# Patient Record
Sex: MALE | Race: WHITE | Employment: FULL TIME | URBAN - METROPOLITAN AREA
[De-identification: names, ages, dates, MRNs, and addresses within clinical notes are randomized per-mention and may not be internally consistent; named-entity substitution may affect disease eponyms.]

---

## 2018-05-14 ENCOUNTER — TELEPHONE (OUTPATIENT)
Dept: FAMILY MEDICINE | Facility: CLINIC | Age: 48
End: 2018-05-14

## 2018-05-14 NOTE — TELEPHONE ENCOUNTER
Reason for Call:  Medication or medication refill:    Do you use a [facility]?  Name of the pharmacy and phone number for the current request:  [facility] [address] [place] [address] [place] AT [facility] [address]    Name of the medication requested: Azithromycin    Other request: Patient is requesting the medication above. States his daughter was diagnosed with whopping cough and was instructed that everyone in the family be treated with the medication above. Please assist. Thanks!    Can we leave a detailed message on this number? YES    Phone number patient can be reached at: Home number on file [phone] (home)    Best Time: Any    Call taken on 5/14/2018 at 6:34 PM by Kaykay Dominguez

## 2018-05-14 NOTE — TELEPHONE ENCOUNTER
PT called clinic again.  Family wanted treatment tonight.  I recommended each family member do on [email or web address] virtual care visit.  Providers respond in one hour.  QUYEN Geller

## 2018-05-15 ENCOUNTER — OFFICE VISIT (OUTPATIENT)
Dept: FAMILY MEDICINE | Facility: CLINIC | Age: 48
End: 2018-05-15
Payer: COMMERCIAL

## 2018-05-15 VITALS
WEIGHT: 189 LBS | TEMPERATURE: 97.3 F | HEART RATE: 78 BPM | OXYGEN SATURATION: 95 % | BODY MASS INDEX: 26.55 KG/M2 | DIASTOLIC BLOOD PRESSURE: 76 MMHG | SYSTOLIC BLOOD PRESSURE: 109 MMHG

## 2018-05-15 DIAGNOSIS — Z20.818 PERTUSSIS EXPOSURE: Primary | ICD-10-CM

## 2018-05-15 PROCEDURE — 99213 OFFICE O/P EST LOW 20 MIN: CPT | Performed by: FAMILY MEDICINE

## 2018-05-15 RX ORDER — AZITHROMYCIN 250 MG/1
TABLET, FILM COATED ORAL
Qty: 6 TABLET | Refills: 0 | Status: SHIPPED | OUTPATIENT
Start: 2018-05-15 | End: 2022-03-04

## 2018-05-15 NOTE — MR AVS SNAPSHOT
After Visit Summary   5/15/2018    Americo Wesley    MRN: [id number]           Patient Information     Date Of Birth          1970        Visit Information        Provider Department      5/15/2018 4:00 PM Ayan Howell MD [facility]        Today's Diagnoses     Pertussis exposure    -  1       Follow-ups after your visit        Your next 10 appointments already scheduled     May 15, 2018  4:00 PM CDT   Office Visit with Ayan Howell MD   [facility] ([facility])    [address] [place] [id number] [phone]           Bring a current list of meds and any records pertaining to this visit. For Physicals, please bring immunization records and any forms needing to be filled out. Please arrive 10 minutes early to complete paperwork.              Who to contact     If you have questions or need follow up information about today's clinic visit or your schedule please contact [facility] directly at [phone].  Normal or non-critical lab and imaging results will be communicated to you by taggahart, letter or phone within 4 business days after the clinic has received the results. If you do not hear from us within 7 days, please contact the clinic through [employer] or phone. If you have a critical or abnormal lab result, we will notify you by phone as soon as possible.  Submit refill requests through [employer] or call your pharmacy and they will forward the refill request to us. Please allow 3 business days for your refill to be completed.          Additional Information About Your Visit        taggahart Information     [employer] gives you secure access to your electronic health record. If you see a primary care provider, you can also send messages to your care team and make appointments. If you have questions, please call your primary care clinic.  If you do not have a primary care provider, please call [phone]  and they will assist you.        Care EveryWhere ID     This is your Care EveryWhere ID. This could be used by other organizations to access your [facility] medical records  [id number]        Your Vitals Were     Pulse Temperature Pulse Oximetry BMI (Body Mass Index)          78 97.3  F (36.3  C) (Oral) 95% 26.55 kg/m2         Blood Pressure from Last 3 Encounters:   05/15/18 109/76   02/17/16 100/70   06/05/14 115/87    Weight from Last 3 Encounters:   05/15/18 189 lb (85.7 kg)   02/17/16 179 lb (81.2 kg)   10/18/13 189 lb (85.7 kg)              Today, you had the following     No orders found for display         Today's Medication Changes          These changes are accurate as of 5/15/18  3:47 PM.  If you have any questions, ask your nurse or doctor.               Start taking these medicines.        Dose/Directions    azithromycin 250 MG tablet   Commonly known as:  ZITHROMAX   Used for:  Pertussis exposure   Started by:  Ayan Howell MD        Two tablets first day, then one tablet daily for four days.   Quantity:  6 tablet   Refills:  0            Where to get your medicines      These medications were sent to [facility] [address]  [address] [place] [id number]     Phone:  [phone]     azithromycin 250 MG tablet                Primary Care Provider Office Phone # Fax #    Ayan Howell [phone]       [address]  [facility] [address]        Equal Access to Services     [facility] JONI AH: Hadii daren hollidayo Sosergey, waaxda luqadaha, qaybta kaalmada rubens, josesito myers. So [facility] [phone].    ATENCIÓN: Si habla español, tiene a palma disposición servicios gratuitos de asistencia lingüística. Llame al [phone].    We comply with applicable federal civil rights laws and [place] laws. We do not discriminate on the basis of race, color, national origin, age, disability, sex, sexual  orientation, or gender identity.            Thank you!     Thank you for choosing [facility]  for your care. Our goal is always to provide you with excellent care. Hearing back from our patients is one way we can continue to improve our services. Please take a few minutes to complete the written survey that you may receive in the mail after your visit with us. Thank you!             Your Updated Medication List - Protect others around you: Learn how to safely use, store and throw away your medicines at www.disposemymeds.org.          This list is accurate as of 5/15/18  3:47 PM.  Always use your most recent med list.                   Brand Name Dispense Instructions for use Diagnosis    azithromycin 250 MG tablet    ZITHROMAX    6 tablet    Two tablets first day, then one tablet daily for four days.    Pertussis exposure       cyclobenzaprine 5 MG tablet    FLEXERIL    30 tablet    Take 1 tablet (5 mg) by mouth 3 times daily as needed for muscle spasms    Cervicalgia       * NEW MED      Perigaurd - Patient uses for gum(oral)  health        * NO ACTIVE MEDICATIONS           Saline 0.2 % Soln      Spray in nostril daily        * Notice:  This list has 2 medication(s) that are the same as other medications prescribed for you. Read the directions carefully, and ask your doctor or other care provider to review them with you.

## 2018-05-15 NOTE — PROGRESS NOTES
SUBJECTIVE:   Americo Wesley is a 47 year old male who presents to clinic today for the following health issues:    Last night daughters test results were positive for whooping cough. He is up to date with TDAP. He denies cough, fever, chills or shortness of breath.     Problem list and histories reviewed & adjusted, as indicated.  Additional history: as documented        Reviewed and updated as needed this visit by clinical staff  Tobacco  Meds  Med Hx  Surg Hx  Fam Hx  Soc Hx      Reviewed and updated as needed this visit by Provider         ROS:  Constitutional, HEENT, cardiovascular, pulmonary, gi and gu systems are negative, except as otherwise noted.    OBJECTIVE:     /76  Pulse 78  Temp 97.3  F (36.3  C) (Oral)  Wt 189 lb (85.7 kg)  SpO2 95%  BMI 26.55 kg/m2  Body mass index is 26.55 kg/(m^2).  GENERAL: healthy, alert and no distress  EYES: Eyes grossly normal to inspection  HENT: nose and mouth without ulcers or lesions    Diagnostic Test Results:  none     ASSESSMENT/PLAN:     1. Pertussis exposure  - given postexposure prophylaxis   - azithromycin (ZITHROMAX) 250 MG tablet; Two tablets first day, then one tablet daily for four days.  Dispense: 6 tablet; Refill: 0    Deqa Christine Howell MD  [facility]

## 2020-03-02 ENCOUNTER — HEALTH MAINTENANCE LETTER (OUTPATIENT)
Age: 50
End: 2020-03-02

## 2020-12-14 ENCOUNTER — HEALTH MAINTENANCE LETTER (OUTPATIENT)
Age: 50
End: 2020-12-14

## 2021-04-18 ENCOUNTER — HEALTH MAINTENANCE LETTER (OUTPATIENT)
Age: 51
End: 2021-04-18

## 2021-10-02 ENCOUNTER — HEALTH MAINTENANCE LETTER (OUTPATIENT)
Age: 51
End: 2021-10-02

## 2022-03-03 ENCOUNTER — OFFICE VISIT (OUTPATIENT)
Dept: FAMILY MEDICINE | Facility: CLINIC | Age: 52
End: 2022-03-03
Payer: COMMERCIAL

## 2022-03-03 VITALS
WEIGHT: 188.93 LBS | BODY MASS INDEX: 26.54 KG/M2 | DIASTOLIC BLOOD PRESSURE: 90 MMHG | SYSTOLIC BLOOD PRESSURE: 144 MMHG | OXYGEN SATURATION: 99 % | TEMPERATURE: 98.8 F | RESPIRATION RATE: 18 BRPM | HEART RATE: 86 BPM

## 2022-03-03 DIAGNOSIS — R31.0 GROSS HEMATURIA: ICD-10-CM

## 2022-03-03 DIAGNOSIS — N20.0 NEPHROLITHIASIS: Primary | ICD-10-CM

## 2022-03-03 DIAGNOSIS — R10.9 FLANK PAIN: ICD-10-CM

## 2022-03-03 LAB
ALBUMIN UR-MCNC: 100 MG/DL
APPEARANCE UR: CLEAR
BACTERIA #/AREA URNS HPF: ABNORMAL /HPF
BILIRUB UR QL STRIP: NEGATIVE
COLOR UR AUTO: YELLOW
GLUCOSE UR STRIP-MCNC: NEGATIVE MG/DL
HGB UR QL STRIP: ABNORMAL
KETONES UR STRIP-MCNC: NEGATIVE MG/DL
LEUKOCYTE ESTERASE UR QL STRIP: NEGATIVE
NITRATE UR QL: NEGATIVE
PH UR STRIP: 5.5 [PH] (ref 5–8)
RBC #/AREA URNS AUTO: ABNORMAL /HPF
SP GR UR STRIP: >=1.03 (ref 1–1.03)
SQUAMOUS #/AREA URNS AUTO: ABNORMAL /LPF
UROBILINOGEN UR STRIP-ACNC: 0.2 E.U./DL
WBC #/AREA URNS AUTO: ABNORMAL /HPF

## 2022-03-03 PROCEDURE — 81001 URINALYSIS AUTO W/SCOPE: CPT | Performed by: PHYSICIAN ASSISTANT

## 2022-03-03 PROCEDURE — 99214 OFFICE O/P EST MOD 30 MIN: CPT | Performed by: PHYSICIAN ASSISTANT

## 2022-03-03 RX ORDER — ONDANSETRON 4 MG/1
4 TABLET, ORALLY DISINTEGRATING ORAL EVERY 8 HOURS PRN
Qty: 21 TABLET | Refills: 0 | Status: SHIPPED | OUTPATIENT
Start: 2022-03-03 | End: 2022-03-10

## 2022-03-03 RX ORDER — HYDROCODONE BITARTRATE AND ACETAMINOPHEN 5; 325 MG/1; MG/1
1 TABLET ORAL EVERY 6 HOURS PRN
Qty: 10 TABLET | Refills: 0 | Status: SHIPPED | OUTPATIENT
Start: 2022-03-03 | End: 2022-03-06

## 2022-03-03 RX ORDER — TAMSULOSIN HYDROCHLORIDE 0.4 MG/1
0.4 CAPSULE ORAL DAILY
Qty: 7 CAPSULE | Refills: 0 | Status: SHIPPED | OUTPATIENT
Start: 2022-03-03 | End: 2022-03-10

## 2022-03-04 ENCOUNTER — VIRTUAL VISIT (OUTPATIENT)
Dept: UROLOGY | Facility: CLINIC | Age: 52
End: 2022-03-04
Payer: COMMERCIAL

## 2022-03-04 ENCOUNTER — TELEPHONE (OUTPATIENT)
Dept: UROLOGY | Facility: CLINIC | Age: 52
End: 2022-03-04
Payer: COMMERCIAL

## 2022-03-04 DIAGNOSIS — N13.2 HYDRONEPHROSIS WITH URINARY OBSTRUCTION DUE TO URETERAL CALCULUS: ICD-10-CM

## 2022-03-04 DIAGNOSIS — N20.0 NEPHROLITHIASIS: ICD-10-CM

## 2022-03-04 DIAGNOSIS — R10.9 ACUTE RIGHT FLANK PAIN: ICD-10-CM

## 2022-03-04 DIAGNOSIS — N20.1 CALCULUS OF URETER: Primary | ICD-10-CM

## 2022-03-04 PROCEDURE — 99203 OFFICE O/P NEW LOW 30 MIN: CPT | Mod: GT | Performed by: PHYSICIAN ASSISTANT

## 2022-03-04 ASSESSMENT — PAIN SCALES - GENERAL: PAINLEVEL: NO PAIN (0)

## 2022-03-04 NOTE — PATIENT INSTRUCTIONS
Symptom Control While Passing A Stone    The goal of [facility] is to let a smaller kidney stone (less than 4 to 5 mm) pass without intervention if possible. Giving your body a chance to clear the stone may take a few hours up to a few weeks.  Keeping you well-informed, safe and fairly comfortable is important.    Drink to thirst  Do not attempt to  flush out  your stone by drinking too much fluid. This does not work and may increase nausea. Drink enough to satisfy your body s thirst. Eating your normal diet is fine.   Medications (that may be suggested or prescribed)    Ibuprofen (Advil or Motrin) Available over the counter  o Take two (200mg) tablets every six hours until the stone passes.  o Prevents spasm of the ureter.    o Decreases pain.      Dramamine* (drowsy version, non-generic formulation) Available over the counter  o Take 50mg at bedtime  o Decreases spasms of the ureter  o Decreases nausea  o Decreases acute pain  o Decreases recurrence of pain for 24 hours  o Will help you sleep  *This medication will cause increase drowsiness, do not drive or operate machinery for 6 hours.      Narcotics (Percocet, Vicodin, Dilaudid) Take as prescribed for severe pain unrelieved by ibuprofen and Dramamine  o Narcotics have significant side effects and only  cover-up  pain. They have no effect on the cause of pain.  o Common side effects  - Confusion, disorientation and sedation - DO NOT DRIVE OR OPERATE MACHINERY WITHIN 24 HOURS  - Nausea - take Dramamine or Zofran or Haldol to help control  - Constipation  - Sleep disturbances      Ondansetron (Zofran) Take as prescribed  o Reserve for severe nausea  o May cause constipation, start over the counter Miralax if needed      Second Line Anti-Nausea Medication: Adding a different anti-nausea medication maybe helpful for persistent nausea.  The combined effect of different types of anti-nausea medications maybe more effective than either medication by itself,  even in higher doses.  o Compazine: Take as prescribed      Information about kidney stones    Crystals can form if chemicals are too concentrated in your urine. If the crystal grows over time, a stone may form. A stone usually isn t painful while it is still in the kidney.    As the stone begins to leave the kidney, you may experience episodes of flank pain as the kidney stone approaches the entrance to the ureter. Some people feel a vague ache in the side.    Kidney stones may fall into the ureter. Some stones are tiny and pass through without causing symptoms. The ureter is a small tube (approximately 1/8 of an inch wide). A kidney stone can get stuck and block the ureter. If this happens, urine backs up and flows back to the kidney. Back pressure on the kidney can cause:  o Severe flank pain radiating to the groin.  o Severe nausea and vomiting.  o The pain can occur in the lower back, side, groin or all three.      When the stone reaches the lower ureter, this can irritate the bladder and sensations of feeling the urge to urinate frequently and urgently may occur.      Once the stone passes out of your ureter and into your bladder, the symptoms of urgency and frequency will often disappear. Sometimes pain will come back for a short period and will not be as severe as before. The passage of the stone from your bladder and out of your body is usually not a problem. The urethra is at least twice as wide as the normal ureter, so the stone doesn t usually block it.    Strain all urine  If you pass the stone, save it. Place it in the container we have provided and bring it to the [facility] within a week of passing it. Your stone will then be sent for analysis which takes about a month.     Signs and symptoms you might experience    Nausea    Decreased appetite    Urinary frequency    Bloody urine     Chills    Fatigue    When to call [facility] or go to the Emergency Room    Fever with a  temperature greater than 100.1    Severe pain    Persistent nausea/vomiting    If the pain worsens or nausea/vomiting is uncontrolled with medications, STOP eating & drinking. You need to have an empty stomach for 8 hours prior to surgery. Call the [facility] immediately at [phone].           Follow-up    Low dose CT scan with doctor visit 1-2 weeks after initial clinic visit per doctor s instructions    Please cancel the CT scan visit if you pass a stone. Reschedule for a one month follow-up with doctor to discuss stone composition and future prevention.    Preventing future stones    Approximately a month after your stone is sent out for analysis, a prevention visit will occur with your provider, to discuss an individualized plan for prevention of new stones and to discuss managing stones that you may still have. Along with the analysis of the kidney stone, blood and urine tests may be indicated to develop this plan. Knowing the type of kidney stones you make, and why, allows the providers at the [facility] to recommend specific ways to prevent them.    Follow-up visits are an important part of monitoring and preventing future re-occurrences.    The [facility] is available for questions or concerns 24 hours a day at [phone]    Calcium Oxalate Stone Prevention Self Management    Drink more fluids:    Drinking more liquids is the best way you can help prevent future stones. Stones can form when substances in the urine are too concentrated. The more you drink, the more urine you will make. This means all substances in the urine will be less concentrated.    How much urine should I be producing?    The usual recommended daily urine production is about 2 to 3 quarts (9487-4800 ml). If you are producing more than 3 quarts of urine on a regular basis, it is possible to deplete important minerals stored in the body.    To measure the amount of urine you produce in a day, you  can either:    Collect all urine in a container and measure at the end of the day     Use a measuring cup each time you urinate and add up the amounts at the end of the day     Observe    Color - Dark fredy urine is concentrated. Light straw color or lighter is dilute and desirable     Odor - Concentrated urine tends to smell stronger. Dilute urine is nearly odorless    Ways to increase your fluid intake    Increasing the amount of fluid you drink is effective for all types of kidney stones. While water is commonly recommended, all fluids are effective for increasing the amount of urine your body produces.    Focus on starting a lifelong habit, rather than a short-term solution.     Keep liquids on hand that you like. Crystal Light is a low calorie appropriate choice.    Drink out of larger glasses. You'll tend to drink more with each serving.     Have an additional glass of fluid with each meal.     Keep a water or drink bottle at work and fill it regularly.     *If you are prone to fluid retention, consult your doctor before making changes to your fluid habits.    Low Oxalate Diet:    Avoid excess amounts or daily consumption of these foods:    All nuts and nut products including peanuts, almonds, pecans, peanut butter, almond milk    Rhubarb    Chocolate    Soybeans and soy products     Spinach    Wheat Germ    Beets    Maintain a normal calcium diet:    Researches have found that people with low calcium intakes tend to have more stones. Foods with high calcium content are acceptable and include:    Dairy products (including milk, cheese and yogurt)    Meat and fish    Enriched cereals    Dark green vegetables    What about calcium supplements?     Many people take calcium supplements, either on their own or as prescribed by a doctor. Research has indicated that calcium supplements do not usually pose a risk for stone formation.  Calcium citrate is a better choice for a supplement.    Avoid excess salt:    Salt  (sodium chloride) is found in abundance in many foods. High sodium levels in the urine can interfere with the kidney's handling of calcium.     Tips for reducing the salt in your diet:    Don't use salt at the table    Reduce the salt used in food preparation. Try 1/2 teaspoon when recipes call for 1 teaspoon.    Use herbs and spices for flavoring instead of salt.    Avoid salty foods.    Check the label before you buy or use a product. Note sodium and portion size information.    Try to consume less than 2,000 mg/day. (1 teaspoon = 2,000 mg)    Foods with high sodium content include:    Processed meat (including luncheon meats, sausage)     Crackers     Instant cereal     Processed cheese     Canned soups     Chips and snack foods     Soy sauce    The [facility] can respond to your questions or concerns 24 hours a day at [phone].

## 2022-03-04 NOTE — PROGRESS NOTES
Patient presents with:  Consult: burning on urination, urge to urinate, blood in urine,  low back pain      Clinical Decision Making:  I had a conversation with the patient stating that he has a kidney stone at the UVJ but it is small enough to pass.  In fact he most likely has already passed 2 small stones patient is treated with symptomatic medication to help with the passage and treatment of the nephrolithiasis.  A small amount of pain medicine is also sent in for the patient.  Expected course of resolution is reviewed.  He will have close contact and follow-up with the [facility] tomorrow morning.  Indication for emergent return to the emergency room was also reviewed.  Questions were answered to patient satisfaction before discharge.      ICD-10-CM    1. Nephrolithiasis  N20.0 Adult Urology Referral     tamsulosin (FLOMAX) 0.4 MG capsule     ondansetron (ZOFRAN-ODT) 4 MG ODT tab     HYDROcodone-acetaminophen (NORCO) 5-325 MG tablet     CANCELED: Adult Urology Referral   2. Flank pain  R10.9 CT Abdomen Pelvis w/o Contrast     CANCELED: CT Abdomen Pelvis w/o Contrast   3. Gross hematuria  R31.0 UA macro with reflex to Microscopic and Culture - Clinc Collect     Urine Microscopic     CT Abdomen Pelvis w/o Contrast     CANCELED: CT Abdomen Pelvis w/o Contrast       Patient Instructions   Take the medications as written.  Call the [facility] in the morning for evaluation and treatment.        [facility]   Recommended Steps to Success after Your Visit    Ourexperience has shown that early clinical contact with the [facility] can significantly improve patient experience with a kidney stone.    The goals of the [facility] ([facility]) are to minimize lifeimpact of stone disease on patients by:    Helping patients pass small stones   Promptly recognizing situations where stone passage is unlikely   Designing strategies for long-term prevention ofrecurrence    [facility]  works closely with our patients during acute stone episode to keep them well informed, safe and as comfortable as possible. We recognize that these stone events are a major interruption toyour life. It may take up to a month for an event to resolve but our goal is that you should be able to maintain your usual activities including driving and going to work during this period.    [facility] will call you the next business day after discharge to schedule follow-up care. You can usually see a stone specialist within 24 hours of making contact with [facility]. If you have not been contacted by 9:00 am pleasecall [facility] directly at [phone]      FIVE STEPS TO CONTROLLING YOUR STONE EPISODE UNTIL SEEN AT [facility]  1. DRINK TO THIRST - YOU CANNOT FLUSH OUT A STONE  2. TAKE MEDICATIONS THAT DECREASE SPASM  (IBUPROFEN AND DRAMAMINE) REGULARLY EVEN IF YOU ARE NOT IN PAIN  3. ONLY USE NARCOTIC PAIN KILLERS FOR SEVERE PAIN  4. IF YOU DEVELOP A FEVER, CALL [facility]  5. Contact [facility] directly for concerns at any time 24/7        What You Need To Know About Stone Disease    Natural History of Stone Passage    Crystals can form if chemicals are too concentrated in your urine.  If the crystal grows over time, a stone may form.  A stone usually is not painful while it is still in the kidney.    As the stonebegins to leave the kidney, you may experience episodes of flank pain as the kidney stone approaches the entrance to the ureter. Some people feel a vague ache in the side.    Kidney stones may fall into the ureter.  Somestones are tiny and pass through without causing symptoms. The ureter is a small tube (approx 1/8 of an inch wide). A kidney stone can get stuck and block the ureter.  If this happens, [facility] backs [facility]. Back pressure on the kidney can cause:  o Severe flank pain radiating to the groin  o Severe nausea and vomiting   o The pain can occur in the lower back, side, groin or all three.       The probability of a stone passing all the way down the ureter is related to its size (small is good) and how far it has traveled down the ureter at time of recognition (further is better).    When the stonereaches the lower ureter, this can irritate the bladder and sensations of feeling the urge to urinate frequently and urgently may occur.      Once the stone passes out of your ureter and into your bladder, the symptoms ofurgency and frequency will often disappear.  Sometimes pain will come back for a short period and will not be as severe as before. The passage of the stone from your bladder and out of your body is usually not a problem. Theurethra is at least twice as wide as the normal ureter, so the stone doesn t usually block it.    Signs and Symptoms You Might Experience    Nausea    Decreased appetite    Urinary frequency    Bloody urine     Chills    Fatigue      What to Watch For:    FEVER is NEVER normal - Call[facility] Immediately    Severe Nausea - if you cannot keep fluid or pain medication down call [facility]    Uncontrolled pain - Call [facility]    Absence of pain does not mean that the stone has passed. For a larger stone,only a CT scan showing that the stone is gone, or actually having the stone in your possession are reliable evidence of stone passage      Steps you can take to increase chances for passing yourstone    Drink to Thirst:  Do not attempt to  flush out  your stone by drinking too much fluid.  This does not work and may increase nausea.  Drink enough to satisfy your body sthirst.  Eating your normal diet is fine    Strain all Urine:  If you pas the stone, please try and save it if possible. Place it in the container we have provided and bring it to the [facility] clinic within a week ofpassing it.    Medications: (that may be suggested or prescribed):    Medications that prevent further episodes of severe pain and help stones pass: Take these as prescribed on a regular basis even if you arecurrently NOT  in pain      Ibuprofen (Advil or Motrin)- Is available over the counter Take 2 (200mg) tablets every 6 hours until the stone passes.  o prevents spasm of the ureter.    o Decreases pain      Dramamine- (drowsy version, non-generic formulation) Is available over the counter and decreases spasm of theureter.  Take 50mg at bedtime every night until the stone passes. In addition, take every 6 hours as needed.  Dramamine:  o Decreases nausea  o Decreases acute pain  o Decreases recurrence of pain fornext 24 hours  o Will help you sleep        *This medication will cause increased drowsiness, do not drive or operate machinery for 6 hours      Flomax-Studies show stones pass faster.   o Take every day with food until stone passes even if you do not have pain  o Flomax does not relieve pain.        *This medication may causenasal congestion or light-headedness    Medication that are taken as needed to manage break through symptoms: Take these ONLY as required and hopefully not at all      Narcotics(Percocet, Vicodin, Dilaudid)- take as prescribed for severe pain unrelieved by ibuprofen and dramamine  o Take as prescribed for severe pain  o Narcotics have significant side effects and only cover-up  pain. They have no effect on cause of pain.  o Common side effects:  - Confusion, disorientation and sedation - DO NOT DRIVE OR OPERATE MACHINERY WITHIN 24 HOURS  - Nausea - take Dramamine or Zofran  or Haldol to help control  - Constipation  - Sleep disturbances      Ondansetron (Zofran)-  o Take as prescribed  o Reserve for severe nausea  o May cause constipation, start over the counter Miralax if needed to treat this    Haldol-  o Take as prescribed  o Reserve for severe nausea    If pain worsens or nausea/vomiting uncontrolled with medications, STOP eating & drinking. Call the [facility] immediately. Prior to surgery, youneed to have an empty stomach for 8 hours.  [facility] can respond to your questions or  concerns 24 hours a day at [phone].        Patient Education     Treating Kidney Stones: Expectant Therapy  Most kidney stones are about the size of a grape seed. Stones of this size are small enough to pass through your urine. Once it is passed, a stone can be analyzed. This wait-and-see approach is called expectant therapy.  If pain is a problem, ask your healthcare provider about pain medicines. Then follow his or her directions on how much water to drink. Drinking more water creates more urine to flush out your stone. Also be sure to strain your urine. Take any stones you pass to your provider for analysis.     Drink lots of water  Drinking lots of water may help your stone pass. Water also dilutes the chemicals in your urine. This reduces your risk of forming new stones. You may be told to drink 8, 12-ounce glasses of water a day. Don't drink liquids that dehydrate you. This includes drinks that have caffeine or alcohol.   Strain your urine  Straining your urine lets you collect your stone for analysis. Use the strainer each time you urinate. Strain your urine for as long as your healthcare provider suggests. Watch for brown, tan, gold, or black specks or tiny ciro. These may be kidney stones.   Take your medicine  Your healthcare provider may give you medicine that makes it more likely for you to pass the kidney stone.    Follow up with your healthcare provider  Follow up by taking any stones you find to your provider for analysis. The type of stone you have will determine your diet and prevention program. You may need more tests in the future. These tests will help ensure that new stones are not forming. Contact your provider if you have new symptoms or questions. Keep your follow-up appointments.   [employer] last reviewed this educational content on 4/1/2020 2000-2021 [employer]. All rights reserved. This information is not intended as a substitute for professional medical care.  Always follow your healthcare professional's instructions.               HPI:  Americo Wesley is a 51 year old male who presents today for 3-day history of flank pain and urinary frequency and voiding in small amounts.  Today the patient had noticed that there was gross hematuria and passed 2 small stones from the end of the penis.  After passing the stones the patient had relief of his pain in the pelvic area and at the tip of the penis.  Patient still has some pain on the right flank.    History obtained from chart review and the patient.    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    Vitals:    03/03/22 1737   BP: (!) 144/90   Pulse: 86   Resp: 18   Temp: 98.8  F (37.1  C)   TempSrc: Tympanic   SpO2: 99%   Weight: 85.7 kg (188 lb 15 oz)       General: Patient is resting comfortably no acute distress is afebrile  HEENT: Head is normocephalic atraumatic   eyes are PERRL EOMI sclera anicteric   TMs are clear bilaterally  Throat is with mild pharyngeal wall erythema and no exudate  No cervical lymphadenopathy present  LUNGS: Clear to auscultation bilaterally  HEART: Regular rate and rhythm  Abdomen: Nontender nondistended no rebound or guarding no masses no suprapubic tenderness to palpation or induration or urinary retention palpated no CVA tenderness to percussion.  Skin: Without rash non-diaphoretic    Physical Exam      Labs:  Results for orders placed or performed in visit on  03/03/22   CT Abdomen Pelvis w/o Contrast     Status: None    Narrative    EXAM DATE:         03/03/2022    EXAM: CT ABDOMEN, PELVIS W/O CONTRAST  LOCATION: [facility]  DATE/TIME: 3/3/2022 6:00 PM    INDICATION: Gross hematuria. Low back and flank pain.  COMPARISON: CT abdomen and pelvis March 13, 2013.  TECHNIQUE: CT scan of the abdomen and pelvis was performed without oral or IV contrast. Multiplanar reformats were obtained. Dose reduction techniques were used.  CONTRAST: None.    FINDINGS:  LOWER CHEST: Lung bases are unremarkable.    HEPATOBILIARY: Liver and gallbladder are unremarkable.    PANCREAS: Normal.    SPLEEN: Normal.    ADRENAL GLANDS: Normal.    KIDNEY/BLADDER: Right kidney demonstrates moderate to severe hydronephrosis and hydroureter. There is a stone at the right UVJ, this stone measures 2.5 mm. Stones are measured on bone windows which are the most accurate windows for measurement of stones.   If stones are measured on body windows they will appear larger due to blooming artifact. The stone is likely lodged within the ureter as it crosses the edematous and swollen wall of the bladder. There is some soft tissue seen surrounding the stone which   is likely the wall of the bladder.    BOWEL: Normal.    LYMPH NODES: No retroperitoneal adenopathy. No pelvic adenopathy.    VASCULATURE: Unremarkable.    PELVIC ORGANS: Prostate is enlarged.    MUSCULOSKELETAL: Degenerative changes in the spine.    IMPRESSION:  1.  There is moderate to severe right hydronephrosis and hydroureter with a stone seen at the right UVJ. This is most likely within the thickened edematous wall of the bladder as the ureter crosses into the bladder.             UA macro with reflex to Microscopic and Culture - Clinc Collect     Status: Abnormal    Specimen: Urine, Clean Catch   Result Value Ref Range    Color Urine Yellow Colorless, Straw, Light Yellow, Yellow    Appearance Urine Clear Clear     Glucose Urine Negative Negative mg/dL    Bilirubin Urine Negative Negative    Ketones Urine Negative Negative mg/dL    Specific Gravity Urine >=1.030 1.005 - 1.030    Blood Urine Large (A) Negative    pH Urine 5.5 5.0 - 8.0    Protein Albumin Urine 100  (A) Negative mg/dL    Urobilinogen Urine 0.2 0.2, 1.0 E.U./dL    Nitrite Urine Negative Negative    Leukocyte Esterase Urine Negative Negative   Urine Microscopic     Status: Abnormal   Result Value Ref Range    Bacteria Urine None Seen None Seen /HPF    RBC Urine 25-50 (A) 0-2 /HPF /HPF    WBC Urine None Seen 0-5 /HPF /HPF    Squamous Epithelials Urine None Seen None Seen /LPF    Narrative    Urine Culture not indicated   Results for orders placed or performed in visit on 03/03/22   CT External Imaging Abdomen     Status: None    Narrative    Images were obtained from an external facility.  Click PACS Images   hyperlink to view images.  Textual results have been scanned into the   media tab.     Imaging:    CT Abdomen Pelvis w/o Contrast    Result Date: 3/3/2022    EXAM DATE:         03/03/2022 EXAM: CT ABDOMEN, PELVIS W/O CONTRAST LOCATION: [facility] DATE/TIME: 3/3/2022 6:00 PM INDICATION: Gross hematuria. Low back and flank pain. COMPARISON: CT abdomen and pelvis March 13, 2013. TECHNIQUE: CT scan of the abdomen and pelvis was performed without oral or IV contrast. Multiplanar reformats were obtained. Dose reduction techniques were used. CONTRAST: None. FINDINGS: LOWER CHEST: Lung bases are unremarkable. HEPATOBILIARY: Liver and gallbladder are unremarkable. PANCREAS: Normal. SPLEEN: Normal. ADRENAL GLANDS: Normal. KIDNEY/BLADDER: Right kidney demonstrates moderate to severe hydronephrosis and hydroureter. There is a stone at the right UVJ, this stone measures 2.5 mm. Stones are measured on bone windows which are the most accurate windows for measurement of stones.  If stones are measured on body windows they will appear larger due  to blooming artifact. The stone is likely lodged within the ureter as it crosses the edematous and swollen wall of the bladder. There is some soft tissue seen surrounding the stone which  is likely the wall of the bladder. BOWEL: Normal. LYMPH NODES: No retroperitoneal adenopathy. No pelvic adenopathy. VASCULATURE: Unremarkable. PELVIC ORGANS: Prostate is enlarged. MUSCULOSKELETAL: Degenerative changes in the spine. IMPRESSION: 1.  There is moderate to severe right hydronephrosis and hydroureter with a stone seen at the right UVJ. This is most likely within the thickened edematous wall of the bladder as the ureter crosses into the bladder.         At the end of the encounter, I discussed results, diagnosis, medications. Discussed red flags for immediate return to clinic/ER, as well as indications for follow up if no improvement. Patient understood and agreed to plan. Patient was stable for discharge.

## 2022-03-04 NOTE — TELEPHONE ENCOUNTER
Message left for patient to call clinic to set up an appointment for kidney stone follow-up.  Joann Alberts RN

## 2022-03-04 NOTE — PATIENT INSTRUCTIONS
Take the medications as written.  Call the [facility] in the morning for evaluation and treatment.        [facility]   Recommended Steps to Success after Your Visit    Ourexperience has shown that early clinical contact with the [facility] can significantly improve patient experience with a kidney stone.    The goals of the [facility] ([facility]) are to minimize lifeimpact of stone disease on patients by:    Helping patients pass small stones   Promptly recognizing situations where stone passage is unlikely   Designing strategies for long-term prevention ofrecurrence    [facility] works closely with our patients during acute stone episode to keep them well informed, safe and as comfortable as possible. We recognize that these stone events are a major interruption toyour life. It may take up to a month for an event to resolve but our goal is that you should be able to maintain your usual activities including driving and going to work during this period.    [facility] will call you the next business day after discharge to schedule follow-up care. You can usually see a stone specialist within 24 hours of making contact with [facility]. If you have not been contacted by 9:00 am pleasecall [facility] directly at [phone]      FIVE STEPS TO CONTROLLING YOUR STONE EPISODE UNTIL SEEN AT [facility]  1. DRINK TO THIRST - YOU CANNOT FLUSH OUT A STONE  2. TAKE MEDICATIONS THAT DECREASE SPASM  (IBUPROFEN AND DRAMAMINE) REGULARLY EVEN IF YOU ARE NOT IN PAIN  3. ONLY USE NARCOTIC PAIN KILLERS FOR SEVERE PAIN  4. IF YOU DEVELOP A FEVER, CALL [facility]MMEDIATELY  5. Contact [facility] directly for concerns at any time 24/7        What You Need To Know About Stone Disease    Natural History of Stone Passage    Crystals can form if chemicals are too concentrated in your urine.  If the crystal grows over time, a stone may form.  A stone usually is not painful while it is still in the kidney.    As the stonebegins  to leave the kidney, you may experience episodes of flank pain as the kidney stone approaches the entrance to the ureter. Some people feel a vague ache in the side.    Kidney stones may fall into the ureter.  Somestones are tiny and pass through without causing symptoms. The ureter is a small tube (approx 1/8 of an inch wide). A kidney stone can get stuck and block the ureter.  If this happens, urine backs up and flows back to thekidney. Back pressure on the kidney can cause:  o Severe flank pain radiating to the groin  o Severe nausea and vomiting   o The pain can occur in the lower back, side, groin or all three.      The probability of a stone passing all the way down the ureter is related to its size (small is good) and how far it has traveled down the ureter at time of recognition (further is better).    When the stonereaches the lower ureter, this can irritate the bladder and sensations of feeling the urge to urinate frequently and urgently may occur.      Once the stone passes out of your ureter and into your bladder, the symptoms ofurgency and frequency will often disappear.  Sometimes pain will come back for a short period and will not be as severe as before. The passage of the stone from your bladder and out of your body is usually not a problem. Theurethra is at least twice as wide as the normal ureter, so the stone doesn t usually block it.    Signs and Symptoms You Might Experience    Nausea    Decreased appetite    Urinary frequency    Bloody urine     Chills    Fatigue      What to Watch For:    FEVER is NEVER normal - Call[facility] Immediately    Severe Nausea - if you cannot keep fluid or pain medication down call [facility]    Uncontrolled pain - Call [facility]    Absence of pain does not mean that the stone has passed. For a larger stone,only a CT scan showing that the stone is gone, or actually having the stone in your possession are reliable evidence of stone passage      Steps you can take to increase chances for  passing yourstone    Drink to Thirst:  Do not attempt to  flush out  your stone by drinking too much fluid.  This does not work and may increase nausea.  Drink enough to satisfy your body sthirst.  Eating your normal diet is fine    Strain all Urine:  If you pas the stone, please try and save it if possible. Place it in the container we have provided and bring it to the [facility] clinic within a week ofpassing it.    Medications: (that may be suggested or prescribed):    Medications that prevent further episodes of severe pain and help stones pass: Take these as prescribed on a regular basis even if you arecurrently NOT in pain      Ibuprofen (Advil or Motrin)- Is available over the counter Take 2 (200mg) tablets every 6 hours until the stone passes.  o prevents spasm of the ureter.    o Decreases pain      Dramamine- (drowsy version, non-generic formulation) Is available over the counter and decreases spasm of theureter.  Take 50mg at bedtime every night until the stone passes. In addition, take every 6 hours as needed.  Dramamine:  o Decreases nausea  o Decreases acute pain  o Decreases recurrence of pain fornext 24 hours  o Will help you sleep        *This medication will cause increased drowsiness, do not drive or operate machinery for 6 hours      Flomax-Studies show stones pass faster.   o Take every day with food until stone passes even if you do not have pain  o Flomax does not relieve pain.        *This medication may causenasal congestion or light-headedness    Medication that are taken as needed to manage break through symptoms: Take these ONLY as required and hopefully not at all      Narcotics(Percocet, Vicodin, Dilaudid)- take as prescribed for severe pain unrelieved by ibuprofen and dramamine  o Take as prescribed for severe pain  o Narcotics have significant side effects and only cover-up  pain. They have no effect on cause of pain.  o Common side effects:  - Confusion, disorientation and sedation - DO NOT  DRIVE OR OPERATE MACHINERY WITHIN 24 HOURS  - Nausea - take Dramamine or Zofran  or Haldol to help control  - Constipation  - Sleep disturbances      Ondansetron (Zofran)-  o Take as prescribed  o Reserve for severe nausea  o May cause constipation, start over the counter Miralax if needed to treat this    Haldol-  o Take as prescribed  o Reserve for severe nausea    If pain worsens or nausea/vomiting uncontrolled with medications, STOP eating & drinking. Call the [facility] immediately. Prior to surgery, youneed to have an empty stomach for 8 hours.  [facility] can respond to your questions or concerns 24 hours a day at [phone].        Patient Education     Treating Kidney Stones: Expectant Therapy  Most kidney stones are about the size of a grape seed. Stones of this size are small enough to pass through your urine. Once it is passed, a stone can be analyzed. This wait-and-see approach is called expectant therapy.  If pain is a problem, ask your healthcare provider about pain medicines. Then follow his or her directions on how much water to drink. Drinking more water creates more urine to flush out your stone. Also be sure to strain your urine. Take any stones you pass to your provider for analysis.     Drink lots of water  Drinking lots of water may help your stone pass. Water also dilutes the chemicals in your urine. This reduces your risk of forming new stones. You may be told to drink 8, 12-ounce glasses of water a day. Don't drink liquids that dehydrate you. This includes drinks that have caffeine or alcohol.   Strain your urine  Straining your urine lets you collect your stone for analysis. Use the strainer each time you urinate. Strain your urine for as long as your healthcare provider suggests. Watch for brown, tan, gold, or black specks or tiny ciro. These may be kidney stones.   Take your medicine  Your healthcare provider may give you medicine that makes it more likely for you to  pass the kidney stone.    Follow up with your healthcare provider  Follow up by taking any stones you find to your provider for analysis. The type of stone you have will determine your diet and prevention program. You may need more tests in the future. These tests will help ensure that new stones are not forming. Contact your provider if you have new symptoms or questions. Keep your follow-up appointments.   [employer] last reviewed this educational content on 4/1/2020 2000-2021 [employer]. All rights reserved. This information is not intended as a substitute for professional medical care. Always follow your healthcare professional's instructions.

## 2022-03-04 NOTE — PROGRESS NOTES
Patient is roomed via telephone for a virtual visit.  Patient confirmed he is in the [facility] at the time of this appointment.  Patient understands that this virtual visit is billable and agree to proceed with appointment.

## 2022-03-04 NOTE — PROGRESS NOTES
Assessment/Plan:    Assessment & Plan   Americo was seen today for new patient.    Diagnoses and all orders for this visit:    Calculus of ureter  -     Stone analysis [ACQ041]; Future  -     US Renal Complete; Future    Hydronephrosis with urinary obstruction due to ureteral calculus  -     US Renal Complete; Future    Acute right flank pain    Stone Management Plan  Stone Management 3/4/2022   Urinary Tract Infection No suspicion of infection   Renal Colic Asymptomatic at this time   Renal Failure No suspicion of renal failure   Current CT date 3/3/2022   Right sided stones? Yes   R Number of ureteral stones 1   R GSD of ureteral stones 4   R Location of ureteral stone Distal   R Number of kidney stones  No renal stones   R Hydronephrosis Mild   R Stone Event New event   Diagnosis date 3/3/2022   Initial location of primary symptomatic stone Distal   Initial GSD of primary symptomatic stone 4   R MET status Initiation   R Current Plan MET   MET 2 week F/U   Left sided stones? No   L Stone Event No current event           PLAN    52 yo M with recent stone passage but persistent right flank pain secondary to obstructing extreme right distal ureteral stone.     Will proceed with medical expulsive therapy. Risks and benefits were detailed of medical expulsive therapy including probability of stone passage, recurrent renal colic, and requirement of emergency medical and/or surgical care and further imaging. Patient verbalized understanding. Patient agrees with plan as discussed. He will return in 2 weeks with renal US unless stone captured in interval.    For symptom control, he was prescribed vicodin, ondansetron and Flomax. Over the counter symptom control medications of ibuprofen, Dramamine and Tylenol were recommended.    Video call duration: 19 minutes  22 minutes spent on the date of the encounter doing chart review, history and exam, documentation and further activities per the note    Linda Kirkland PA-C  [facility]  [facility]    Subjective:     HPI  Mr. Americo Wesley is a 51 year old  male who is being evaluated via a billable video visit by [facility] referred by PCP for urolithiasis.    He is a first time unidentified composition stone former. He has no identified modifiable stone risk factors. He has identified non-modifiable stone risks including:  limited family history.    He was seen by his PCP office yesterday for history of right flank pain with urinary frequency, starting 3 days prior. He reported having gross hematuria and passed 2 small stones prior to his appointment. He noted relief of pelvic pain but persistent mild right flank pain. Workup ensued including labs and imaging. UA showed microscopic hematuria without infection. CT scan reported right hydronephrosis with a right UVJ stone. He was prescribed zofran, flomax, and Norco.       He is doing much better, with mild intermittent right abdominal pain. Pertinent negative current symptoms include:  fever, chills, left flank pain, nausea, vomiting, hematuria, urinary frequency and dysuria.     CT scan from 3/3/22 is personally reviewed and demonstrates a mildly obstructing 4 mm right UVJ stone.    Significant labs from presentation include severe hematuria, no pyuria, negative nitrite and no bacteria.    ROS   A 12 point comprehensive review of systems is negative except for HPI    Past Medical History:   Diagnosis Date     CARDIOVASCULAR SCREENING; LDL GOAL LESS THAN 160      Kidney stone 03/03/2022     Periodontitis        Past Surgical History:   Procedure Laterality Date     [facility] ORAL SURGERY PROCEDURE  2/2012    [place] Teeth Extraction       Current Outpatient Medications   Medication Sig Dispense Refill     HYDROcodone-acetaminophen (NORCO) 5-325 MG tablet Take 1 tablet by mouth every 6 hours as needed for severe pain 10 tablet 0     tamsulosin (FLOMAX) 0.4 MG capsule Take 1 capsule (0.4  mg) by mouth daily for 7 days 7 capsule 0     ondansetron (ZOFRAN-ODT) 4 MG ODT tab Take 1 tablet (4 mg) by mouth every 8 hours as needed for nausea 21 tablet 0       No Known Allergies    Social History     Socioeconomic History     Marital status:      Spouse name: Emelyn     Number of children: 1     Years of education: Not on file     Highest education level: Not on file   Occupational History     Occupation: [employer]     Employer: [employer]   Tobacco Use     Smoking status: Never Smoker     Smokeless tobacco: Never Used   Substance and Sexual Activity     Alcohol use: Yes     Comment: 1 or 2 at most (social) once a week     Drug use: No     Sexual activity: Yes     Partners: Female   Other Topics Concern     Parent/sibling w/ CABG, MI or angioplasty before 65F 55M? No      Service Not Asked     Blood Transfusions Not Asked     Caffeine Concern Not Asked     Comment: Coffee in the am - 1 to 2 cups      Occupational Exposure Not Asked     Hobby Hazards Not Asked     Sleep Concern Not Asked     Stress Concern Not Asked     Weight Concern Not Asked     Special Diet Not Asked     Back Care Not Asked     Exercise Not Asked     Comment:  Walking - no strenous exercise program     Bike Helmet Not Asked     Seat Belt Not Asked     Self-Exams Not Asked   Social History Narrative     Not on file     Social Determinants of Health     Financial Resource Strain: Not on file   Food Insecurity: Not on file   Transportation Needs: Not on file   Physical Activity: Not on file   Stress: Not on file   Social Connections: Not on file   Intimate Partner Violence: Not on file   Housing Stability: Not on file       Family History   Problem Relation Age of Onset     Cerebrovascular Disease Father         2010     Cancer Father         Throat CA     Cancer Brother 47     Neurologic Disorder Mother         dementia       Objective:     No vitals or physical exam obtained due to virtual visit    LABS  Most Recent  Urinalysis:Recent Labs   Lab Test 03/03/22  1746   COLOR Yellow   APPEARANCE Clear   URINEGLC Negative   URINEBILI Negative   URINEKETONE Negative   SG >=1.030   UBLD Large*   URINEPH 5.5   PROTEIN 100 *   UROBILINOGEN 0.2   NITRITE Negative   LEUKEST Negative   RBCU 25-50*   WBCU None Seen

## 2022-03-07 ENCOUNTER — LAB (OUTPATIENT)
Dept: LAB | Facility: HOSPITAL | Age: 52
End: 2022-03-07
Payer: COMMERCIAL

## 2022-03-07 DIAGNOSIS — N20.1 CALCULUS OF URETER: ICD-10-CM

## 2022-03-07 PROCEDURE — 82365 CALCULUS SPECTROSCOPY: CPT

## 2022-03-10 LAB
APPEARANCE STONE: NORMAL
COMPN STONE: NORMAL
SPECIMEN WT: 25 MG

## 2022-03-11 NOTE — PROGRESS NOTES
Assessment/Plan:    Assessment & Plan   Americo was seen today for follow up.    Diagnoses and all orders for this visit:    Calculus of ureter  -     CT Abdomen Pelvis w/o Contrast; Future  -     Patient Stated Goal: Prevent further stones    Stone Management Plan  Stone Management 3/4/2022 3/15/2022   Urinary Tract Infection No suspicion of infection No suspicion of infection   Renal Colic Asymptomatic at this time Asymptomatic at this time   Renal Failure No suspicion of renal failure No suspicion of renal failure   Current CT date 3/3/2022 -   Right sided stones? Yes -   R Number of ureteral stones 1 -   R GSD of ureteral stones 4 -   R Location of ureteral stone Distal -   R Number of kidney stones  No renal stones -   R Hydronephrosis Mild -   R Stone Event New event Resolved event   Diagnosis date 3/3/2022 -   Initial location of primary symptomatic stone Distal -   Initial GSD of primary symptomatic stone 4 -   Resolved date - 3/7/2022   R MET status Initiation Passed   R Current Plan MET -   MET 2 week F/U -   Left sided stones? No -   L Stone Event No current event No current event             PLAN    52 yo pleasant M with CaOx stone and recent stone passage event. No current stone burden.      He is congratulated on passing his stone and will not proceed with stone risk evaluation. We discussed dietary measures for prevention. He will return in 12 months with imaging.    Video call duration: 10 minutes  14 minutes spent on the date of the encounter doing chart review, history and exam, documentation and further activities per the note    Linda Kirkland PA-C  [facility]    HPI  Mr. Americo Wesley is a 51 year old  male who is being evaluated via a billable video visit by [facility] [place] for medical expulsive therapy follow up.     On last encounter, his 4 mm stone was in right distal ureter with mild hydronephrosis. He has had no  unanticipated events.    He has analysis results to discuss following passage of his stone. He is asymptomatic at present. He denies symptoms of fever, chills, flank pain, nausea, vomiting, urinary frequency and dysuria.    Stone analysis from 3/7/22 100% CaOx.    ROS   Review of systems is negative except for HPI.    Past Medical History:   Diagnosis Date     CARDIOVASCULAR SCREENING; LDL GOAL LESS THAN 160      Kidney stone 03/03/2022     Periodontitis      Past Surgical History:   Procedure Laterality Date     [facility] ORAL SURGERY PROCEDURE  2/2012    [place] Teeth Extraction     No current outpatient medications on file.       No Known Allergies    Social History     Socioeconomic History     Marital status:      Spouse name: Emelyn     Number of children: 1     Years of education: Not on file     Highest education level: Not on file   Occupational History     Occupation: [employer]     Employer: [employer]   Tobacco Use     Smoking status: Never Smoker     Smokeless tobacco: Never Used   Substance and Sexual Activity     Alcohol use: Yes     Comment: 1 or 2 at most (social) once a week     Drug use: No     Sexual activity: Yes     Partners: Female   Other Topics Concern     Parent/sibling w/ CABG, MI or angioplasty before 65F 55M? No      Service Not Asked     Blood Transfusions Not Asked     Caffeine Concern Not Asked     Comment: Coffee in the am - 1 to 2 cups      Occupational Exposure Not Asked     Hobby Hazards Not Asked     Sleep Concern Not Asked     Stress Concern Not Asked     Weight Concern Not Asked     Special Diet Not Asked     Back Care Not Asked     Exercise Not Asked     Comment:  Walking - no strenous exercise program     Bike Helmet Not Asked     Seat Belt Not Asked     Self-Exams Not Asked   Social History Narrative     Not on file     Social Determinants of Health     Financial Resource Strain: Not on file   Food Insecurity: Not on file   Transportation Needs: Not on file    Physical Activity: Not on file   Stress: Not on file   Social Connections: Not on file   Intimate Partner Violence: Not on file   Housing Stability: Not on file       Family History   Problem Relation Age of Onset     Cerebrovascular Disease Father         2010     Cancer Father         Throat CA     Cancer Brother 47     Neurologic Disorder Mother         dementia       Objective:     Appears AAO x 3  No vitals obtained due to virtual visit

## 2022-03-15 ENCOUNTER — VIRTUAL VISIT (OUTPATIENT)
Dept: UROLOGY | Facility: CLINIC | Age: 52
End: 2022-03-15
Payer: COMMERCIAL

## 2022-03-15 DIAGNOSIS — N20.1 CALCULUS OF URETER: Primary | ICD-10-CM

## 2022-03-15 PROCEDURE — 99213 OFFICE O/P EST LOW 20 MIN: CPT | Mod: GT | Performed by: PHYSICIAN ASSISTANT

## 2022-03-15 ASSESSMENT — PAIN SCALES - GENERAL: PAINLEVEL: NO PAIN (0)

## 2022-03-15 NOTE — PATIENT INSTRUCTIONS
Patient Stated Goal: Prevent further stones  Calcium Oxalate Stone Prevention Self Management    Drink more fluids:    Drinking more liquids is the best way you can help prevent future stones. Stones can form when substances in the urine are too concentrated. The more you drink, the more urine you will make. This means all substances in the urine will be less concentrated.    How much urine should I be producing?    The usual recommended daily urine production is about 2 to 3 quarts (3271-8904 ml). If you are producing more than 3 quarts of urine on a regular basis, it is possible to deplete important minerals stored in the body.    To measure the amount of urine you produce in a day, you can either:    Collect all urine in a container and measure at the end of the day     Use a measuring cup each time you urinate and add up the amounts at the end of the day     Observe    Color - Dark fredy urine is concentrated. Light straw color or lighter is dilute and desirable     Odor - Concentrated urine tends to smell stronger. Dilute urine is nearly odorless    Ways to increase your fluid intake    Increasing the amount of fluid you drink is effective for all types of kidney stones. While water is commonly recommended, all fluids are effective for increasing the amount of urine your body produces.    Focus on starting a lifelong habit, rather than a short-term solution.     Keep liquids on hand that you like. Crystal Light is a low calorie appropriate choice.    Drink out of larger glasses. You'll tend to drink more with each serving.     Have an additional glass of fluid with each meal.     Keep a water or drink bottle at work and fill it regularly.     *If you are prone to fluid retention, consult your doctor before making changes to your fluid habits.    Low Oxalate Diet:    Avoid excess amounts or daily consumption of these foods:    All nuts and nut products including peanuts, almonds, pecans, peanut butter, almond  milk    Rhubarb    Chocolate    Soybeans and soy products     Spinach    Wheat Germ    Beets    Maintain a normal calcium diet:    Researches have found that people with low calcium intakes tend to have more stones. Foods with high calcium content are acceptable and include:    Dairy products (including milk, cheese and yogurt)    Meat and fish    Enriched cereals    Dark green vegetables    What about calcium supplements?     Many people take calcium supplements, either on their own or as prescribed by a doctor. Research has indicated that calcium supplements do not usually pose a risk for stone formation.  Calcium citrate is a better choice for a supplement.    Avoid excess salt:    Salt (sodium chloride) is found in abundance in many foods. High sodium levels in the urine can interfere with the kidney's handling of calcium.     Tips for reducing the salt in your diet:    Don't use salt at the table    Reduce the salt used in food preparation. Try 1/2 teaspoon when recipes call for 1 teaspoon.    Use herbs and spices for flavoring instead of salt.    Avoid salty foods.    Check the label before you buy or use a product. Note sodium and portion size information.    Try to consume less than 2,000 mg/day. (1 teaspoon = 2,000 mg)    Foods with high sodium content include:    Processed meat (including luncheon meats, sausage)     Crackers     Instant cereal     Processed cheese     Canned soups     Chips and snack foods     Soy sauce    [facility] [place] can respond to your questions or concerns 24 hours a day at [phone].

## 2022-03-15 NOTE — PROGRESS NOTES
Patient is roomed via telephone for a virtual visit.  Patient confirmed he is in the [facility] at the time of this appointment.  Patient understands that this virtual visit is billable and agree to proceed with appointment.

## 2022-05-14 ENCOUNTER — HEALTH MAINTENANCE LETTER (OUTPATIENT)
Age: 52
End: 2022-05-14

## 2022-09-04 ENCOUNTER — HEALTH MAINTENANCE LETTER (OUTPATIENT)
Age: 52
End: 2022-09-04

## 2023-06-03 ENCOUNTER — HEALTH MAINTENANCE LETTER (OUTPATIENT)
Age: 53
End: 2023-06-03

## 2024-07-06 ENCOUNTER — HEALTH MAINTENANCE LETTER (OUTPATIENT)
Age: 54
End: 2024-07-06